# Patient Record
(demographics unavailable — no encounter records)

---

## 2025-05-28 NOTE — HISTORY OF PRESENT ILLNESS
[FreeTextEntry1] : RUTH DVAIS 28 YO, presents for Annual & Urinary incontinence.  G 1 P 1001 - 1  LMP: 25 - Irregular menses Sexually active, using condoms No Medication No family history of breast cancer. To do monthly SBE.  Complains of leaking when sneezing or coughing since delivering in . Will refer to DR. Sullivan for Incontinence & pelvic floor therapy.  PCOS & menstrual disturbance discussion @ length.  For pelvic sonogram today- to call for results.